# Patient Record
Sex: FEMALE | Race: BLACK OR AFRICAN AMERICAN | NOT HISPANIC OR LATINO | URBAN - METROPOLITAN AREA
[De-identification: names, ages, dates, MRNs, and addresses within clinical notes are randomized per-mention and may not be internally consistent; named-entity substitution may affect disease eponyms.]

---

## 2022-07-01 ENCOUNTER — TELEPHONE ENCOUNTER (OUTPATIENT)
Dept: URBAN - METROPOLITAN AREA CLINIC 72 | Facility: CLINIC | Age: 47
End: 2022-07-01

## 2022-08-09 ENCOUNTER — WEB ENCOUNTER (OUTPATIENT)
Dept: URBAN - METROPOLITAN AREA CLINIC 113 | Facility: CLINIC | Age: 47
End: 2022-08-09

## 2022-08-11 ENCOUNTER — OFFICE VISIT (OUTPATIENT)
Dept: URBAN - METROPOLITAN AREA CLINIC 113 | Facility: CLINIC | Age: 47
End: 2022-08-11
Payer: COMMERCIAL

## 2022-08-11 ENCOUNTER — DASHBOARD ENCOUNTERS (OUTPATIENT)
Age: 47
End: 2022-08-11

## 2022-08-11 VITALS
SYSTOLIC BLOOD PRESSURE: 172 MMHG | HEART RATE: 99 BPM | WEIGHT: 208 LBS | TEMPERATURE: 97.3 F | BODY MASS INDEX: 35.51 KG/M2 | DIASTOLIC BLOOD PRESSURE: 94 MMHG | HEIGHT: 64 IN

## 2022-08-11 DIAGNOSIS — D50.8 ACQUIRED IRON DEFICIENCY ANEMIA DUE TO DECREASED ABSORPTION: ICD-10-CM

## 2022-08-11 DIAGNOSIS — Z79.1 NSAID LONG-TERM USE: ICD-10-CM

## 2022-08-11 DIAGNOSIS — K21.9 GASTROESOPHAGEAL REFLUX DISEASE, UNSPECIFIED WHETHER ESOPHAGITIS PRESENT: ICD-10-CM

## 2022-08-11 PROBLEM — 235595009: Status: ACTIVE | Noted: 2022-08-11

## 2022-08-11 PROCEDURE — 99243 OFF/OP CNSLTJ NEW/EST LOW 30: CPT | Performed by: INTERNAL MEDICINE

## 2022-08-11 RX ORDER — NIFEDIPINE 60 MG/1
1 TABLET ON AN EMPTY STOMACH TABLET, EXTENDED RELEASE ORAL ONCE A DAY
Status: ACTIVE | COMMUNITY

## 2022-08-11 RX ORDER — PANTOPRAZOLE SODIUM 40 MG/1
1 TABLET TABLET, DELAYED RELEASE ORAL ONCE A DAY
Status: ACTIVE | COMMUNITY

## 2022-08-11 RX ORDER — SODIUM, POTASSIUM,MAG SULFATES 17.5-3.13G
354 ML SOLUTION, RECONSTITUTED, ORAL ORAL ONCE
Qty: 354 MILLILITER | Refills: 0 | OUTPATIENT
Start: 2022-08-11 | End: 2022-08-12

## 2022-08-11 RX ORDER — ATORVASTATIN CALCIUM 80 MG/1
1 TABLET TABLET, FILM COATED ORAL ONCE A DAY
Status: ACTIVE | COMMUNITY

## 2022-08-11 RX ORDER — PANTOPRAZOLE SODIUM 40 MG/1
1 TABLET TABLET, DELAYED RELEASE ORAL ONCE A DAY
OUTPATIENT

## 2022-08-11 RX ORDER — CARVEDILOL 12.5 MG/1
1 TABLET WITH FOOD TABLET, FILM COATED ORAL TWICE A DAY
Status: ACTIVE | COMMUNITY

## 2022-08-11 RX ORDER — FERROUS SULFATE 324(65)MG
1 TABLET TABLET, DELAYED RELEASE (ENTERIC COATED) ORAL ONCE A DAY
Status: ACTIVE | COMMUNITY

## 2022-08-11 RX ORDER — FEXOFENADINE HCL 60 MG/1
1 TABLET TABLET, FILM COATED ORAL TWICE A DAY
Status: ACTIVE | COMMUNITY

## 2022-08-11 RX ORDER — LISINOPRIL 40 MG/1
1 TABLET TABLET ORAL ONCE A DAY
Status: ACTIVE | COMMUNITY

## 2022-08-11 NOTE — HPI-OTHER HISTORIES
Labs 2/1/2022:Low hemoglobin 9.6, low hematocrit 30.5, low MCV 66.7, elevated platelets 531. Labs 3/24/2022:Low ferritin 7, low total iron 19, low iron saturation 5, low TSH 0.283, normal T4, normal lipid panel, glucose 117, potassium 3.4, normal LFTs, normal hemoglobin A1c.

## 2022-09-08 ENCOUNTER — OFFICE VISIT (OUTPATIENT)
Dept: URBAN - METROPOLITAN AREA SURGERY CENTER 25 | Facility: SURGERY CENTER | Age: 47
End: 2022-09-08
Payer: COMMERCIAL

## 2022-09-08 ENCOUNTER — TELEPHONE ENCOUNTER (OUTPATIENT)
Dept: URBAN - METROPOLITAN AREA CLINIC 113 | Facility: CLINIC | Age: 47
End: 2022-09-08

## 2022-09-08 ENCOUNTER — WEB ENCOUNTER (OUTPATIENT)
Dept: URBAN - METROPOLITAN AREA SURGERY CENTER 25 | Facility: SURGERY CENTER | Age: 47
End: 2022-09-08

## 2022-09-08 DIAGNOSIS — D50.9 ANEMIA, IRON DEFICIENCY: ICD-10-CM

## 2022-09-08 DIAGNOSIS — B96.81 H PYLORI +: ICD-10-CM

## 2022-09-08 DIAGNOSIS — K29.60 OTHER GASTRITIS WITHOUT BLEEDING: ICD-10-CM

## 2022-09-08 PROCEDURE — G8907 PT DOC NO EVENTS ON DISCHARG: HCPCS | Performed by: INTERNAL MEDICINE

## 2022-09-08 PROCEDURE — 43239 EGD BIOPSY SINGLE/MULTIPLE: CPT | Performed by: INTERNAL MEDICINE

## 2022-09-08 RX ORDER — PANTOPRAZOLE SODIUM 40 MG/1
1 TABLET TABLET, DELAYED RELEASE ORAL ONCE A DAY
Status: ACTIVE | COMMUNITY

## 2022-09-08 RX ORDER — ATORVASTATIN CALCIUM 80 MG/1
1 TABLET TABLET, FILM COATED ORAL ONCE A DAY
Status: ACTIVE | COMMUNITY

## 2022-09-08 RX ORDER — NIFEDIPINE 60 MG/1
1 TABLET ON AN EMPTY STOMACH TABLET, EXTENDED RELEASE ORAL ONCE A DAY
Status: ACTIVE | COMMUNITY

## 2022-09-08 RX ORDER — CARVEDILOL 12.5 MG/1
1 TABLET WITH FOOD TABLET, FILM COATED ORAL TWICE A DAY
Status: ACTIVE | COMMUNITY

## 2022-09-08 RX ORDER — FEXOFENADINE HCL 60 MG/1
1 TABLET TABLET, FILM COATED ORAL TWICE A DAY
Status: ACTIVE | COMMUNITY

## 2022-09-08 RX ORDER — LISINOPRIL 40 MG/1
1 TABLET TABLET ORAL ONCE A DAY
Status: ACTIVE | COMMUNITY

## 2022-09-08 RX ORDER — FERROUS SULFATE 324(65)MG
1 TABLET TABLET, DELAYED RELEASE (ENTERIC COATED) ORAL ONCE A DAY
Status: ACTIVE | COMMUNITY

## 2022-09-19 PROBLEM — 87522002 IRON DEFICIENCY ANEMIA: Status: ACTIVE | Noted: 2022-09-19

## 2022-09-19 PROBLEM — 4556007 GASTRITIS: Status: ACTIVE | Noted: 2022-09-19

## 2022-09-21 ENCOUNTER — TELEPHONE ENCOUNTER (OUTPATIENT)
Dept: URBAN - METROPOLITAN AREA CLINIC 113 | Facility: CLINIC | Age: 47
End: 2022-09-21

## 2022-09-21 RX ORDER — CLARITHROMYCIN 500 MG/1
1 TABLET TABLET, FILM COATED ORAL
Qty: 28 TABLET | Refills: 0 | OUTPATIENT

## 2022-09-21 RX ORDER — AMOXICILLIN 500 MG/1
1 CAPSULE CAPSULE ORAL TWICE A DAY
Qty: 28 CAPSULE | Refills: 0 | OUTPATIENT
Start: 2022-09-21 | End: 2022-10-05

## 2022-09-21 RX ORDER — PANTOPRAZOLE SODIUM 40 MG/1
1 TABLET TABLET, DELAYED RELEASE ORAL TWICE A DAY
Qty: 28 TABLET | Refills: 0 | OUTPATIENT
Start: 2022-09-21

## 2022-09-21 RX ORDER — METRONIDAZOLE 250 MG/1
1 TABLET TABLET ORAL
Qty: 42 TABLET | Refills: 0 | OUTPATIENT
Start: 2022-09-21 | End: 2022-10-05

## 2022-10-04 ENCOUNTER — OFFICE VISIT (OUTPATIENT)
Dept: URBAN - METROPOLITAN AREA SURGERY CENTER 25 | Facility: SURGERY CENTER | Age: 47
End: 2022-10-04

## 2022-10-07 ENCOUNTER — CLAIMS CREATED FROM THE CLAIM WINDOW (OUTPATIENT)
Dept: URBAN - METROPOLITAN AREA SURGERY CENTER 25 | Facility: SURGERY CENTER | Age: 47
End: 2022-10-07
Payer: COMMERCIAL

## 2022-10-07 ENCOUNTER — CLAIMS CREATED FROM THE CLAIM WINDOW (OUTPATIENT)
Dept: URBAN - METROPOLITAN AREA SURGERY CENTER 25 | Facility: SURGERY CENTER | Age: 47
End: 2022-10-07

## 2022-10-07 DIAGNOSIS — D50.9 IRON DEFICIENCY ANEMIA, UNSPECIFIED IRON DEFICIENCY ANEMIA TYPE: ICD-10-CM

## 2022-10-07 PROCEDURE — 45378 DIAGNOSTIC COLONOSCOPY: CPT | Performed by: INTERNAL MEDICINE

## 2022-10-07 PROCEDURE — G8907 PT DOC NO EVENTS ON DISCHARG: HCPCS | Performed by: INTERNAL MEDICINE

## 2022-10-07 RX ORDER — FEXOFENADINE HCL 60 MG/1
1 TABLET TABLET, FILM COATED ORAL TWICE A DAY
Status: ACTIVE | COMMUNITY

## 2022-10-07 RX ORDER — FERROUS SULFATE 324(65)MG
1 TABLET TABLET, DELAYED RELEASE (ENTERIC COATED) ORAL ONCE A DAY
Status: ACTIVE | COMMUNITY

## 2022-10-07 RX ORDER — NIFEDIPINE 60 MG/1
1 TABLET ON AN EMPTY STOMACH TABLET, EXTENDED RELEASE ORAL ONCE A DAY
Status: ACTIVE | COMMUNITY

## 2022-10-07 RX ORDER — PANTOPRAZOLE SODIUM 40 MG/1
1 TABLET TABLET, DELAYED RELEASE ORAL ONCE A DAY
Status: ACTIVE | COMMUNITY

## 2022-10-07 RX ORDER — CLARITHROMYCIN 500 MG/1
1 TABLET TABLET, FILM COATED ORAL
Qty: 28 TABLET | Refills: 0 | Status: ACTIVE | COMMUNITY

## 2022-10-07 RX ORDER — ATORVASTATIN CALCIUM 80 MG/1
1 TABLET TABLET, FILM COATED ORAL ONCE A DAY
Status: ACTIVE | COMMUNITY

## 2022-10-07 RX ORDER — PANTOPRAZOLE SODIUM 40 MG/1
1 TABLET TABLET, DELAYED RELEASE ORAL TWICE A DAY
Qty: 28 TABLET | Refills: 0 | Status: ACTIVE | COMMUNITY
Start: 2022-09-21

## 2022-10-07 RX ORDER — CARVEDILOL 12.5 MG/1
1 TABLET WITH FOOD TABLET, FILM COATED ORAL TWICE A DAY
Status: ACTIVE | COMMUNITY

## 2022-10-07 RX ORDER — LISINOPRIL 40 MG/1
1 TABLET TABLET ORAL ONCE A DAY
Status: ACTIVE | COMMUNITY

## 2022-11-22 ENCOUNTER — OFFICE VISIT (OUTPATIENT)
Dept: URBAN - METROPOLITAN AREA CLINIC 113 | Facility: CLINIC | Age: 47
End: 2022-11-22

## 2023-01-26 PROBLEM — 87522002: Status: ACTIVE | Noted: 2022-08-11

## 2023-06-25 ENCOUNTER — CLAIMS CREATED FROM THE CLAIM WINDOW (OUTPATIENT)
Dept: URBAN - METROPOLITAN AREA MEDICAL CENTER 43 | Facility: MEDICAL CENTER | Age: 48
End: 2023-06-25
Payer: COMMERCIAL

## 2023-06-25 DIAGNOSIS — K64.8 OTHER HEMORRHOIDS: ICD-10-CM

## 2023-06-25 DIAGNOSIS — I26.99 OTHER PULMONARY EMBOLISM WITHOUT ACUTE COR PULMONALE: ICD-10-CM

## 2023-06-25 DIAGNOSIS — K62.5 ANAL BLEEDING: ICD-10-CM

## 2023-06-25 PROCEDURE — 99253 IP/OBS CNSLTJ NEW/EST LOW 45: CPT | Performed by: STUDENT IN AN ORGANIZED HEALTH CARE EDUCATION/TRAINING PROGRAM

## 2023-06-25 PROCEDURE — 99221 1ST HOSP IP/OBS SF/LOW 40: CPT | Performed by: STUDENT IN AN ORGANIZED HEALTH CARE EDUCATION/TRAINING PROGRAM

## 2023-06-26 ENCOUNTER — CLAIMS CREATED FROM THE CLAIM WINDOW (OUTPATIENT)
Dept: URBAN - METROPOLITAN AREA MEDICAL CENTER 43 | Facility: MEDICAL CENTER | Age: 48
End: 2023-06-26
Payer: COMMERCIAL

## 2023-06-26 ENCOUNTER — TELEPHONE ENCOUNTER (OUTPATIENT)
Dept: URBAN - METROPOLITAN AREA CLINIC 113 | Facility: CLINIC | Age: 48
End: 2023-06-26

## 2023-06-26 DIAGNOSIS — I26.99 OTHER PULMONARY EMBOLISM WITHOUT ACUTE COR PULMONALE: ICD-10-CM

## 2023-06-26 DIAGNOSIS — D62 ABLA (ACUTE BLOOD LOSS ANEMIA): ICD-10-CM

## 2023-06-26 DIAGNOSIS — K62.5 ANAL BLEEDING: ICD-10-CM

## 2023-06-26 DIAGNOSIS — K59.01 CONSTIPATION BY DELAYED COLONIC TRANSIT: ICD-10-CM

## 2023-06-26 DIAGNOSIS — K64.8 OTHER HEMORRHOIDS: ICD-10-CM

## 2023-06-26 PROCEDURE — 99232 SBSQ HOSP IP/OBS MODERATE 35: CPT | Performed by: INTERNAL MEDICINE

## 2023-07-14 ENCOUNTER — TELEPHONE ENCOUNTER (OUTPATIENT)
Dept: URBAN - METROPOLITAN AREA CLINIC 113 | Facility: CLINIC | Age: 48
End: 2023-07-14

## 2023-09-19 ENCOUNTER — OFFICE VISIT (OUTPATIENT)
Dept: URBAN - METROPOLITAN AREA CLINIC 113 | Facility: CLINIC | Age: 48
End: 2023-09-19

## 2023-09-19 NOTE — HPI-TODAY'S VISIT:
48-year-old female presents for long interval follow-up and evaluation of hematochezia.  She was last seen on 8/11/2022.  She was planned for EGD and colonoscopy to work-up her iron deficiency anemia with consideration of capsule endoscopy.  We would consider increasing her PPI to twice daily as well. EGD 9/8/2022:Performed without difficulty.  Normal esophagus.  Gastritis noted and biopsied.  Pathology revealed chronic active gastritis, positive for H. pylori.  Normal duodenum. She was treated with clarithromycin, metronidazole, amoxicillin and pantoprazole for 14 days. Colonoscopy 10/7/2022:Performed without difficulty.  BBPS score of 9.  Grade 2 nonbleeding internal hemorrhoids.  Distal rectum and anal verge are normal.  Diverticulosis in the sigmoid and descending colon.  No specimens collected.  Repeat colonoscopy in 10 years for screening. She has been referred back by Dr. Juan beasley for hematochezia.  A copy of today's visit will be forwarded to the referring provider. Labs 7/3/2023:Low TSH 0.157, normal T4, hemoglobin 10.3, normal hematocrit, MCV 69, elevated platelets 459, normal CMP, unremarkable lipid panel. 8/11/2022: 46-year-old female with a history of hyperlipidemia, iron deficiency anemia, restless leg syndrome, hypertension, prior CVA (October 2021) on clopidogrel and aspirin, and GERD is referred by Dr. Juan beasley for evaluation of iron deficiency anemia.  A copy of today's visit will be forwarded to the referring provider.  Labs 7/22/2022: Low hemoglobin 9.5, low hematocrit 31.1, low MCV 63.5, elevated platelets 515, low TSH 0.3, normal T4, normal CMP, elevated hemoglobin A1c 5.7.  She is currently on clopidogrel. She was diagnosed with anemia after her TIA last year. She does have heavy menstrual cycles. She is taking a daily iron supplement currently. She denies shortness of breath or weakness however she does have chronic fatigue. She denies blood per rectum or melena. Bowel movements are regular. She admits she does not eat like she should due to her medications upsetting her stomach. She was started on Pantoprazole for severe heartburn after eating certain foods. She has only been on the medication since late July and has not noticed much improvement yet. She denies dysphagia. She does take ibuprofen at least 2-3 times per week for menstrual cramping and knee aches. She has been doing this for years. She does not always take the NSAIDs with food. She has never had a colonoscopy or EGD. Denies family history of colon cancer or other GI malignancies.   Patient does not have an established cardiologist however Dr. Beasley manages her anticoagulation.

## 2024-05-02 NOTE — HPI-TODAY'S VISIT:
46-year-old female with a history of hyperlipidemia, iron deficiency anemia, restless leg syndrome, hypertension, prior CVA (October 2021) on clopidogrel and aspirin, and GERD is referred by Dr. Juan beasley for evaluation of iron deficiency anemia.  A copy of today's visit will be forwarded to the referring provider.  Labs 7/22/2022: Low hemoglobin 9.5, low hematocrit 31.1, low MCV 63.5, elevated platelets 515, low TSH 0.3, normal T4, normal CMP, elevated hemoglobin A1c 5.7.  She is currently on clopidogrel. She was diagnosed with anemia after her TIA last year. She does have heavy menstrual cycles. She is taking a daily iron supplement currently. She denies shortness of breath or weakness however she does have chronic fatigue. She denies blood per rectum or melena. Bowel movements are regular. She admits she does not eat like she should due to her medications upsetting her stomach. She was started on Pantoprazole for severe heartburn after eating certain foods. She has only been on the medication since late July and has not noticed much improvement yet. She denies dysphagia. She does take ibuprofen at least 2-3 times per week for menstrual cramping and knee aches. She has been doing this for years. She does not always take the NSAIDs with food. She has never had a colonoscopy or EGD. Denies family history of colon cancer or other GI malignancies.   Patient does not have an established cardiologist however Dr. Beasley manages her anticoagulation.
Stable

## 2024-07-18 ENCOUNTER — OFFICE VISIT (OUTPATIENT)
Dept: URBAN - METROPOLITAN AREA CLINIC 113 | Facility: CLINIC | Age: 49
End: 2024-07-18

## 2024-07-18 RX ORDER — FERROUS SULFATE 324(65)MG
1 TABLET TABLET, DELAYED RELEASE (ENTERIC COATED) ORAL ONCE A DAY
Status: ACTIVE | COMMUNITY

## 2024-07-18 RX ORDER — PANTOPRAZOLE SODIUM 40 MG/1
1 TABLET TABLET, DELAYED RELEASE ORAL TWICE A DAY
Qty: 28 TABLET | Refills: 0 | Status: ACTIVE | COMMUNITY
Start: 2022-09-21

## 2024-07-18 RX ORDER — ATORVASTATIN CALCIUM 80 MG/1
1 TABLET TABLET, FILM COATED ORAL ONCE A DAY
Status: ACTIVE | COMMUNITY

## 2024-07-18 RX ORDER — CARVEDILOL 12.5 MG/1
1 TABLET WITH FOOD TABLET, FILM COATED ORAL TWICE A DAY
Status: ACTIVE | COMMUNITY

## 2024-07-18 RX ORDER — CLARITHROMYCIN 500 MG/1
1 TABLET TABLET, FILM COATED ORAL
Qty: 28 TABLET | Refills: 0 | Status: ACTIVE | COMMUNITY

## 2024-07-18 RX ORDER — NIFEDIPINE 60 MG/1
1 TABLET ON AN EMPTY STOMACH TABLET, EXTENDED RELEASE ORAL ONCE A DAY
Status: ACTIVE | COMMUNITY

## 2024-07-18 RX ORDER — PANTOPRAZOLE SODIUM 40 MG/1
1 TABLET TABLET, DELAYED RELEASE ORAL ONCE A DAY
Status: ACTIVE | COMMUNITY

## 2024-07-18 RX ORDER — LISINOPRIL 40 MG/1
1 TABLET TABLET ORAL ONCE A DAY
Status: ACTIVE | COMMUNITY

## 2024-07-18 RX ORDER — FEXOFENADINE HCL 60 MG/1
1 TABLET TABLET, FILM COATED ORAL TWICE A DAY
Status: ACTIVE | COMMUNITY

## 2024-07-18 NOTE — HPI-TODAY'S VISIT:
She has been referred back by Jennifer crooks NP, for lower GI hemorrhage.  A copy of today's visit will be forwarded to the referring provider.  Cervical spine x-ray 5/11/2024: No acute finding.  Right upper quadrant ultrasound 1/14/2024: Normal.  CT abdomen and pelvis with contrast 1/14/2024: 5.3 cm left adnexal cyst likely representing ovarian cyst.  Recommend nonemergent pelvic ultrasound for better evaluation.  Postoperative changes.  CT angiography of chest with IV contrast 1/5/2024: There are several small bilateral segmental pulmonary emboli, overall low clot burden.  Findings were discussed with Dr. Radhika Miller.  Mild dependent atelectasis.  No effusion.  Minimal patchy airspace disease right middle lobe.  Lungs otherwise clear.  Minimal free intraperitoneal gas consistent with hysterectomy yesterday.  Labs 6/14/2024: Normal H/H, low MCV of 78.2, normal WBC, normal platelet count, normal CMP, cholesterol 204, , low TSH of 0.53, low vitamin D of 8.2. She was last seen on 8/11/2022.  She was planned for EGD and colonoscopy to work-up her iron deficiency anemia with consideration of capsule endoscopy.  We would consider increasing her PPI to twice daily as well. EGD 9/8/2022:Performed without difficulty.  Normal esophagus.  Gastritis noted and biopsied.  Pathology revealed chronic active gastritis, positive for H. pylori.  Normal duodenum. She was treated with clarithromycin, metronidazole, amoxicillin and pantoprazole for 14 days. Colonoscopy 10/7/2022:Performed without difficulty.  BBPS score of 9.  Grade 2 nonbleeding internal hemorrhoids.  Distal rectum and anal verge are normal.  Diverticulosis in the sigmoid and descending colon.  No specimens collected.  Repeat colonoscopy in 10 years for screening. She has been referred back by Dr. Juan beasley for hematochezia.  A copy of today's visit will be forwarded to the referring provider. Labs 7/3/2023:Low TSH 0.157, normal T4, hemoglobin 10.3, normal hematocrit, MCV 69, elevated platelets 459, normal CMP, unremarkable lipid panel.  8/11/2022:  46-year-old female with a history of hyperlipidemia, iron deficiency anemia, restless leg syndrome, hypertension, prior CVA (October 2021) on clopidogrel and aspirin, and GERD is referred by Dr. Juan beasley for evaluation of iron deficiency anemia.  A copy of today's visit will be forwarded to the referring provider.  Labs 7/22/2022: Low hemoglobin 9.5, low hematocrit 31.1, low MCV 63.5, elevated platelets 515, low TSH 0.3, normal T4, normal CMP, elevated hemoglobin A1c 5.7.  She is currently on clopidogrel. She was diagnosed with anemia after her TIA last year. She does have heavy menstrual cycles. She is taking a daily iron supplement currently. She denies shortness of breath or weakness however she does have chronic fatigue. She denies blood per rectum or melena. Bowel movements are regular. She admits she does not eat like she should due to her medications upsetting her stomach. She was started on Pantoprazole for severe heartburn after eating certain foods. She has only been on the medication since late July and has not noticed much improvement yet. She denies dysphagia. She does take ibuprofen at least 2-3 times per week for menstrual cramping and knee aches. She has been doing this for years. She does not always take the NSAIDs with food. She has never had a colonoscopy or EGD. Denies family history of colon cancer or other GI malignancies.   Patient does not have an established cardiologist however Dr. Beasley manages her anticoagulation.

## 2024-08-02 ENCOUNTER — OFFICE VISIT (OUTPATIENT)
Dept: URBAN - METROPOLITAN AREA CLINIC 113 | Facility: CLINIC | Age: 49
End: 2024-08-02
Payer: COMMERCIAL

## 2024-08-02 VITALS
BODY MASS INDEX: 33.97 KG/M2 | HEART RATE: 73 BPM | TEMPERATURE: 98.4 F | SYSTOLIC BLOOD PRESSURE: 146 MMHG | WEIGHT: 199 LBS | HEIGHT: 64 IN | DIASTOLIC BLOOD PRESSURE: 94 MMHG

## 2024-08-02 DIAGNOSIS — A04.8 H. PYLORI INFECTION: ICD-10-CM

## 2024-08-02 PROBLEM — 724556004: Status: ACTIVE | Noted: 2024-08-02

## 2024-08-02 PROCEDURE — 99213 OFFICE O/P EST LOW 20 MIN: CPT

## 2024-08-02 PROCEDURE — 99243 OFF/OP CNSLTJ NEW/EST LOW 30: CPT

## 2024-08-02 RX ORDER — LISINOPRIL 40 MG/1
1 TABLET TABLET ORAL ONCE A DAY
Status: ACTIVE | COMMUNITY

## 2024-08-02 RX ORDER — FEXOFENADINE HCL 60 MG/1
1 TABLET TABLET, FILM COATED ORAL TWICE A DAY
Status: ON HOLD | COMMUNITY

## 2024-08-02 RX ORDER — ATORVASTATIN CALCIUM 80 MG/1
1 TABLET TABLET, FILM COATED ORAL ONCE A DAY
Status: ACTIVE | COMMUNITY

## 2024-08-02 RX ORDER — APIXABAN 5 MG/1
AS DIRECTED TABLET, FILM COATED ORAL
Status: ON HOLD | COMMUNITY

## 2024-08-02 RX ORDER — TRAMADOL HYDROCHLORIDE 50 MG/1
1 TABLET AS NEEDED TABLET, FILM COATED ORAL ONCE A DAY
Status: ACTIVE | COMMUNITY

## 2024-08-02 RX ORDER — CARVEDILOL 12.5 MG/1
1 TABLET WITH FOOD TABLET, FILM COATED ORAL TWICE A DAY
Status: ACTIVE | COMMUNITY

## 2024-08-02 RX ORDER — IPRATROPIUM BROMIDE 21 UG/1
2 SPRAYS IN EACH NOSTRIL SPRAY NASAL TWICE A DAY
Status: ACTIVE | COMMUNITY

## 2024-08-02 RX ORDER — PANTOPRAZOLE SODIUM 40 MG/1
1 TABLET TABLET, DELAYED RELEASE ORAL TWICE A DAY
Qty: 28 TABLET | Refills: 0 | Status: ON HOLD | COMMUNITY
Start: 2022-09-21

## 2024-08-02 RX ORDER — NIFEDIPINE 60 MG/1
1 TABLET ON AN EMPTY STOMACH TABLET, EXTENDED RELEASE ORAL ONCE A DAY
Status: ACTIVE | COMMUNITY

## 2024-08-02 RX ORDER — CLONIDINE HYDROCHLORIDE 0.1 MG/1
1 TABLET TABLET ORAL ONCE A DAY
Status: ACTIVE | COMMUNITY

## 2024-08-02 RX ORDER — CLARITHROMYCIN 500 MG/1
1 TABLET TABLET, FILM COATED ORAL
Qty: 28 TABLET | Refills: 0 | Status: ON HOLD | COMMUNITY

## 2024-08-02 RX ORDER — MELOXICAM 15 MG/1
1 TABLET TABLET ORAL ONCE A DAY
Status: ACTIVE | COMMUNITY

## 2024-08-02 RX ORDER — FERROUS SULFATE 324(65)MG
1 TABLET TABLET, DELAYED RELEASE (ENTERIC COATED) ORAL ONCE A DAY
Status: ON HOLD | COMMUNITY

## 2024-08-02 RX ORDER — PANTOPRAZOLE SODIUM 40 MG/1
1 TABLET TABLET, DELAYED RELEASE ORAL ONCE A DAY
Status: ON HOLD | COMMUNITY

## 2024-08-02 RX ORDER — BUTALBITAL, ACETAMINOPHEN, AND CAFFEINE 50; 325; 40 MG/1; MG/1; MG/1
1 TABLET AS NEEDED TABLET ORAL
Status: ACTIVE | COMMUNITY

## 2024-08-02 NOTE — HPI-TODAY'S VISIT:
She has been referred back by Jennifer crooks NP, for lower GI hemorrhage.  A copy of today's visit will be forwarded to the referring provider.  Cervical spine x-ray 5/11/2024: No acute finding.  Right upper quadrant ultrasound 1/14/2024: Normal.  CT abdomen and pelvis with contrast 1/14/2024: 5.3 cm left adnexal cyst likely representing ovarian cyst.  Recommend nonemergent pelvic ultrasound for better evaluation.  Postoperative changes.  CT angiography of chest with IV contrast 1/5/2024: There are several small bilateral segmental pulmonary emboli, overall low clot burden.  Findings were discussed with Dr. Radhika Miller.  Mild dependent atelectasis.  No effusion.  Minimal patchy airspace disease right middle lobe.  Lungs otherwise clear.  Minimal free intraperitoneal gas consistent with hysterectomy yesterday.  Labs 6/14/2024: Normal H/H, low MCV of 78.2, normal WBC, normal platelet count, normal CMP, cholesterol 204, , low TSH of 0.53, low vitamin D of 8.2.   She was last seen on 8/11/2022.  She was planned for EGD and colonoscopy to work up her iron deficiency anemia with consideration of capsule endoscopy.  We would consider increasing her PPI to twice daily as well.  EGD 9/8/2022:Performed without difficulty.  Normal esophagus.  Gastritis noted and biopsied.  Pathology revealed chronic active gastritis, positive for H. pylori.  Normal duodenum.  She was treated with clarithromycin, metronidazole, amoxicillin and pantoprazole for 14 days.  Colonoscopy 10/7/2022:Performed without difficulty.  BBPS score of 9.  Grade 2 nonbleeding internal hemorrhoids.  Distal rectum and anal verge are normal.  Diverticulosis in the sigmoid and descending colon.  No specimens collected.  Repeat colonoscopy in 10 years for screening.  She has been referred back by Dr. Juan beasley for hematochezia.  A copy of today's visit will be forwarded to the referring provider.  Labs 7/3/2023:Low TSH 0.157, normal T4, hemoglobin 10.3, normal hematocrit, MCV 69, elevated platelets 459, normal CMP, unremarkable lipid panel.  She had a hysterectomy in January of this year and her anemia resolved. Denies rectal bleeding. She denies dysphagia however admits to a feeling like something is sitting in her throat or on her throat. She saw ENT. She is scheduled for neck CT. She will have intermittent swelling and pain of her neck. She states it will feel like golf ball on her neck. Her PCP is concerned with her thyroid. SHe has not yet had a thyroid US. She has no GI complaints at this time.   She was not aware of her H. pylori diagnosis in 2022. She did not complete treatment,

## 2024-08-07 ENCOUNTER — TELEPHONE ENCOUNTER (OUTPATIENT)
Dept: URBAN - METROPOLITAN AREA CLINIC 113 | Facility: CLINIC | Age: 49
End: 2024-08-07

## 2024-08-07 LAB — H PYLORI BREATH TEST: DETECTED

## 2024-08-07 RX ORDER — BISMUTH SUBSALICYLATE 262 MG/1
1 TABLET TABLET, CHEWABLE ORAL
Qty: 56 TABLET | Refills: 0 | OUTPATIENT
Start: 2024-08-07 | End: 2024-08-21

## 2024-08-07 RX ORDER — PANTOPRAZOLE SODIUM 40 MG/1
1 TABLET TABLET, DELAYED RELEASE ORAL TWICE DAILY
Qty: 28 | Refills: 0 | OUTPATIENT
Start: 2024-08-07

## 2024-08-07 RX ORDER — METRONIDAZOLE 250 MG/1
1 TABLET TABLET ORAL
Qty: 56 | Refills: 0 | OUTPATIENT
Start: 2024-08-07 | End: 2024-08-21

## 2024-08-07 RX ORDER — DOXYCYCLINE HYCLATE 100 MG/1
1 CAPSULE CAPSULE, GELATIN COATED ORAL TWICE A DAY
Qty: 28 CAPSULE | Refills: 0 | OUTPATIENT
Start: 2024-08-07 | End: 2024-08-21

## 2024-08-07 RX ORDER — FLUCONAZOLE 150 MG/1
1 TABLET TABLET ORAL ONCE
Qty: 1 | Refills: 0 | OUTPATIENT
Start: 2024-08-07 | End: 2024-08-08